# Patient Record
Sex: FEMALE | Race: WHITE | URBAN - METROPOLITAN AREA
[De-identification: names, ages, dates, MRNs, and addresses within clinical notes are randomized per-mention and may not be internally consistent; named-entity substitution may affect disease eponyms.]

---

## 2020-10-22 ENCOUNTER — OFFICE VISIT (OUTPATIENT)
Dept: URBAN - METROPOLITAN AREA CLINIC 41 | Facility: CLINIC | Age: 80
End: 2020-10-22
Payer: MEDICARE

## 2020-10-22 PROCEDURE — 67028 INJECTION EYE DRUG: CPT | Performed by: OPHTHALMOLOGY

## 2020-10-22 PROCEDURE — 92014 COMPRE OPH EXAM EST PT 1/>: CPT | Performed by: OPHTHALMOLOGY

## 2020-10-22 ASSESSMENT — INTRAOCULAR PRESSURE
OD: 13
OS: 16

## 2020-10-22 NOTE — IMPRESSION/PLAN
Impression: Retinal edema, OD. Status: Chronic.
h/o Sterile inflammation OD s/p IVK
s/p IVK x 2 last 03/30/16
s/p PPV/MP/PRP OD 04/04/16 ([facility])
s/p Avastin x 29 last 08/13/2020 (consented 03/05/2020) Plan: Exam and OCT reveal CME OD (351 microns, from 381 microns). The patient prefers not to have FAs. An IVFA from 10/22/2020 demonstrated no telangiectasia. Fundus Photos from 10/22/2020 demonstrated no IRH. The patient notes worsening. Discussed trial of Avastin vs observation. R/B/A discussed with patient. The risks of Avastin were discussed, including off-label use and compounding pharmacy risks, and the patient elects to proceed with Avastin. After 2% Subconjunctival anesthesia & Betadine prep, 1.25mg of Avastin was injected in the eye. Cold compress and Tylenol suggested for pain if needed. Thanks, Luci Colvin Return in 4-6 weeks for follow up, OCT OU (add gel).

## 2020-10-22 NOTE — IMPRESSION/PLAN
Impression: NPDR, OU. Status: Fair Control. Moderate. Plan: DM since 2000. Moderate. No DME. An IVFA from 02/06/2020 demonstrated no NVE OU. Fundus Photos from 02/06/2020 demonstrated IRH. Observe. Blood sugar control.

## 2020-10-22 NOTE — IMPRESSION/PLAN
Impression: h/o ERM, OD. Status: Chronic
s/p PPV/MP/PRP/IVK 04/14/15 (0 [facility]) Plan: Observe.

## 2020-12-03 ENCOUNTER — OFFICE VISIT (OUTPATIENT)
Dept: URBAN - METROPOLITAN AREA CLINIC 41 | Facility: CLINIC | Age: 80
End: 2020-12-03
Payer: MEDICARE

## 2020-12-03 PROCEDURE — 92014 COMPRE OPH EXAM EST PT 1/>: CPT | Performed by: OPHTHALMOLOGY

## 2020-12-03 PROCEDURE — 92134 CPTRZ OPH DX IMG PST SGM RTA: CPT | Performed by: OPHTHALMOLOGY

## 2020-12-03 PROCEDURE — 67028 INJECTION EYE DRUG: CPT | Performed by: OPHTHALMOLOGY

## 2020-12-03 ASSESSMENT — INTRAOCULAR PRESSURE
OD: 17
OS: 16

## 2020-12-03 NOTE — IMPRESSION/PLAN
Impression: h/o ERM, OD. Status: Chronic
s/p PPV/MP/PRP/IVK 04/14/15 (0 [facility]) Plan: Observe.

## 2020-12-03 NOTE — IMPRESSION/PLAN
Impression: Retinal edema, OD. Status: Chronic.
h/o Sterile inflammation OD s/p IVK
s/p IVK x 2 last 03/30/16
s/p PPV/MP/PRP OD 04/04/16 ([facility])
s/p Avastin x 30 last 10/22/2020 (consented 03/05/2020) Plan: Exam and OCT reveal CME OD (353 microns, from 381 microns). The patient prefers not to have FAs. An IVFA from 10/22/2020 demonstrated no telangiectasia. Fundus Photos from 10/22/2020 demonstrated no IRH. The patient notes worsening. Discussed trial of Avastin vs observation. R/B/A discussed with patient. The risks of Avastin were discussed, including off-label use and compounding pharmacy risks, and the patient elects to proceed with Avastin. After 2% Subconjunctival anesthesia & Betadine prep, 1.25mg of Avastin was injected in the eye. Cold compress and Tylenol suggested for pain if needed. Stacey Rizzo Return in 4-6 weeks for follow up, OCT OU (add gel).

## 2021-02-11 ENCOUNTER — OFFICE VISIT (OUTPATIENT)
Dept: URBAN - METROPOLITAN AREA CLINIC 41 | Facility: CLINIC | Age: 81
End: 2021-02-11
Payer: MEDICARE

## 2021-02-11 PROCEDURE — 92014 COMPRE OPH EXAM EST PT 1/>: CPT | Performed by: OPHTHALMOLOGY

## 2021-02-11 PROCEDURE — 67028 INJECTION EYE DRUG: CPT | Performed by: OPHTHALMOLOGY

## 2021-02-11 PROCEDURE — 92134 CPTRZ OPH DX IMG PST SGM RTA: CPT | Performed by: OPHTHALMOLOGY

## 2021-02-11 ASSESSMENT — INTRAOCULAR PRESSURE
OD: 9
OS: 9

## 2021-02-11 NOTE — IMPRESSION/PLAN
Impression: h/o ERM, OD. Status: Chronic
s/p PPV/MP/PRP/IVK 04/14/15 (0 [facility]) Plan: Observe.

## 2021-02-11 NOTE — IMPRESSION/PLAN
Impression: Retinal edema, OD. Status: Chronic.
h/o Sterile inflammation OD s/p IVK
s/p IVK x 2 last 03/30/16
s/p PPV/MP/PRP OD 04/04/16 ([facility])
s/p Avastin x 31 last 12/3/2020 (consented 03/05/2020) Plan: The patient notes blurring. Exam and OCT reveal CME OD (357 microns, from 381 microns). The patient prefers not to have FAs. An IVFA from 10/22/2020 demonstrated no telangiectasia. Fundus Photos from 10/22/2020 demonstrated no IRH. The patient notes worsening. Discussed trial of Avastin vs observation. R/B/A discussed with patient. The risks of Avastin were discussed, including off-label use and compounding pharmacy risks, and the patient elects to proceed with Avastin. After 2% Subconjunctival anesthesia & Betadine prep, 1.25mg of Avastin was injected in the eye. Cold compress and Tylenol suggested for pain if needed. Thanks, Mindy Gordillo Return in 4-6 weeks for follow up, OCT OU (add gel).

## 2021-03-11 ENCOUNTER — OFFICE VISIT (OUTPATIENT)
Dept: URBAN - METROPOLITAN AREA CLINIC 41 | Facility: CLINIC | Age: 81
End: 2021-03-11
Payer: MEDICARE

## 2021-03-11 PROCEDURE — 67028 INJECTION EYE DRUG: CPT | Performed by: OPHTHALMOLOGY

## 2021-03-11 PROCEDURE — 92014 COMPRE OPH EXAM EST PT 1/>: CPT | Performed by: OPHTHALMOLOGY

## 2021-03-11 PROCEDURE — 92134 CPTRZ OPH DX IMG PST SGM RTA: CPT | Performed by: OPHTHALMOLOGY

## 2021-03-11 ASSESSMENT — INTRAOCULAR PRESSURE
OD: 12
OS: 15

## 2021-03-11 NOTE — IMPRESSION/PLAN
Impression: Retinal edema, OD. Status: Chronic.
h/o Sterile inflammation OD s/p IVK
s/p IVK x 2 last 03/30/16
s/p PPV/MP/PRP OD 04/04/16 ([facility])
s/p Avastin x 32 last 02/11/2021 (consented 03/05/2020) Plan: The patient notes continued blurring. Exam and OCT reveal CME OD (357 microns, from 381 microns). The patient prefers not to have FAs. An IVFA from 10/22/2020 demonstrated no telangiectasia. Fundus Photos from 10/22/2020 demonstrated no IRH. The patient notes worsening. Discussed trial of Avastin vs observation. R/B/A discussed with patient. The risks of Avastin were discussed, including off-label use and compounding pharmacy risks, and the patient elects to proceed with Avastin. After 2% Subconjunctival anesthesia & Betadine prep, 1.25mg of Avastin was injected in the eye. Cold compress and Tylenol suggested for pain if needed. Thanks, Arti Del Angel Return in 4-6 weeks for follow up, OCT OU (add gel).

## 2021-03-11 NOTE — IMPRESSION/PLAN
Impression: h/o ERM, OD. Status: Chronic
s/p PPV/MP/PRP/IVK 04/14/15 (0 [facility]) Plan: Observe.

## 2021-04-22 ENCOUNTER — OFFICE VISIT (OUTPATIENT)
Dept: URBAN - METROPOLITAN AREA CLINIC 41 | Facility: CLINIC | Age: 81
End: 2021-04-22
Payer: MEDICARE

## 2021-04-22 DIAGNOSIS — H35.81 RETINAL EDEMA: Primary | ICD-10-CM

## 2021-04-22 DIAGNOSIS — H01.004 UNSPECIFIED BLEPHARITIS LEFT UPPER EYELID: ICD-10-CM

## 2021-04-22 PROCEDURE — 92134 CPTRZ OPH DX IMG PST SGM RTA: CPT | Performed by: OPHTHALMOLOGY

## 2021-04-22 PROCEDURE — 92014 COMPRE OPH EXAM EST PT 1/>: CPT | Performed by: OPHTHALMOLOGY

## 2021-04-22 PROCEDURE — 67028 INJECTION EYE DRUG: CPT | Performed by: OPHTHALMOLOGY

## 2021-04-22 ASSESSMENT — INTRAOCULAR PRESSURE
OD: 15
OS: 16

## 2021-04-22 NOTE — IMPRESSION/PLAN
Impression: h/o ERM, OD. Status: Chronic
s/p PPV/MP/PRP/IVK 04/14/15 (0 [facility]) Plan: Observe.

## 2021-04-22 NOTE — IMPRESSION/PLAN
Impression: Retinal edema, OD. Status: Chronic.
h/o Sterile inflammation OD s/p IVK
s/p IVK x 2 last 03/30/16
s/p PPV/MP/PRP OD 04/04/16 ([facility])
s/p Avastin x 33  last 03/11/2021  Plan: Exam and OCT reveal CME OD (355 microns, from 381 microns). The patient prefers not to have FAs. An IVFA from 10/22/2020 demonstrated no telangiectasia. Fundus Photos from 10/22/2020 demonstrated no IRH. The patient notes worsening. Discussed trial of Avastin vs observation. R/B/A discussed with patient. The risks of Avastin were discussed, including off-label use and compounding pharmacy risks, and the patient elects to proceed with Avastin. After 2% Subconjunctival anesthesia & Betadine prep, 1.25mg of Avastin was injected in the eye. Cold compress and Tylenol suggested for pain if needed. Thanks, Verta Kayser Return in 4-6 weeks for follow up, OCT OU (add gel).

## 2021-05-20 ENCOUNTER — OFFICE VISIT (OUTPATIENT)
Dept: URBAN - METROPOLITAN AREA CLINIC 41 | Facility: CLINIC | Age: 81
End: 2021-05-20
Payer: MEDICARE

## 2021-05-20 DIAGNOSIS — H25.12 AGE-RELATED NUCLEAR CATARACT, LEFT EYE: ICD-10-CM

## 2021-05-20 PROCEDURE — 92014 COMPRE OPH EXAM EST PT 1/>: CPT | Performed by: OPHTHALMOLOGY

## 2021-05-20 PROCEDURE — 67028 INJECTION EYE DRUG: CPT | Performed by: OPHTHALMOLOGY

## 2021-05-20 PROCEDURE — 92134 CPTRZ OPH DX IMG PST SGM RTA: CPT | Performed by: OPHTHALMOLOGY

## 2021-05-20 ASSESSMENT — INTRAOCULAR PRESSURE
OS: 14
OD: 13

## 2021-05-20 NOTE — IMPRESSION/PLAN
Impression: Retinal edema, OD. Status: Chronic.
h/o Sterile inflammation OD s/p IVK
s/p IVK x 2 last 03/30/16
s/p PPV/MP/PRP OD 04/04/16 ([facility])
s/p Avastin x 34  last 04/22/2021 Plan: Exam and OCT reveal CME OD (356 microns, from 381 microns). The patient prefers not to have FAs. An IVFA from 10/22/2020 demonstrated no telangiectasia. Fundus Photos from 10/22/2020 demonstrated no IRH. The patient notes worsening. Discussed trial of Avastin vs observation. R/B/A discussed with patient. The risks of Avastin were discussed, including off-label use and compounding pharmacy risks, and the patient elects to proceed with Avastin. After 2% Subconjunctival anesthesia & Betadine prep, 1.25mg of Avastin was injected in the eye. Cold compress and Tylenol suggested for pain if needed. Thanks, Luci Colvin Return in 4-6 weeks for follow up, OCT OU (add gel).

## 2021-05-20 NOTE — IMPRESSION/PLAN
Impression: h/o ERM, OD. Status: Chronic
s/p PPV/MP/PRP/IVK 04/14/15 (0 [facility]) Plan: Observe.

## 2021-07-01 ENCOUNTER — OFFICE VISIT (OUTPATIENT)
Dept: URBAN - METROPOLITAN AREA CLINIC 41 | Facility: CLINIC | Age: 81
End: 2021-07-01
Payer: MEDICARE

## 2021-07-01 DIAGNOSIS — E11.3393 TYPE 2 DIAB WITH MOD NONP RTNOP WITHOUT MACULAR EDEMA, BI: ICD-10-CM

## 2021-07-01 DIAGNOSIS — H35.371 PUCKERING OF MACULA, RIGHT EYE: ICD-10-CM

## 2021-07-01 PROCEDURE — 92014 COMPRE OPH EXAM EST PT 1/>: CPT | Performed by: OPHTHALMOLOGY

## 2021-07-01 PROCEDURE — 67028 INJECTION EYE DRUG: CPT | Performed by: OPHTHALMOLOGY

## 2021-07-01 PROCEDURE — 92134 CPTRZ OPH DX IMG PST SGM RTA: CPT | Performed by: OPHTHALMOLOGY

## 2021-07-01 ASSESSMENT — INTRAOCULAR PRESSURE
OS: 10
OD: 10

## 2021-07-01 NOTE — IMPRESSION/PLAN
Impression: Retinal edema, OD. Status: Chronic.
h/o Sterile inflammation OD s/p IVK
s/p IVK x 2 last 03/30/16
s/p PPV/MP/PRP OD 04/04/16 ([facility])
s/p Avastin x 35  last 05/20/2021 Plan: Exam and OCT reveal CME OD (358 microns, from 381 microns). The patient prefers not to have FAs. An IVFA from 10/22/2020 demonstrated no telangiectasia. Fundus Photos from 10/22/2020 demonstrated no IRH. The patient notes worsening. Discussed trial of Avastin vs observation. R/B/A discussed with patient. The risks of Avastin were discussed, including off-label use and compounding pharmacy risks, and the patient elects to proceed with Avastin. After 2% Subconjunctival anesthesia & Betadine prep, 1.25mg of Avastin was injected in the eye. Cold compress and Tylenol suggested for pain if needed. Thanks, Yessica Mail Return in 4-6 weeks for follow up, OCT OU (add gel).

## 2021-07-01 NOTE — IMPRESSION/PLAN
Impression: h/o ERM, OD. Status: Chronic
s/p PPV/MP/PRP/IVK 04/14/15 ([facility]) Plan: Observe.

## 2021-07-29 ENCOUNTER — OFFICE VISIT (OUTPATIENT)
Dept: URBAN - METROPOLITAN AREA CLINIC 41 | Facility: CLINIC | Age: 81
End: 2021-07-29
Payer: MEDICARE

## 2021-07-29 DIAGNOSIS — Z96.1 PRESENCE OF INTRAOCULAR LENS: ICD-10-CM

## 2021-07-29 DIAGNOSIS — H53.10 UNSPECIFIED SUBJECTIVE VISUAL DISTURBANCES: ICD-10-CM

## 2021-07-29 PROCEDURE — 92134 CPTRZ OPH DX IMG PST SGM RTA: CPT | Performed by: OPHTHALMOLOGY

## 2021-07-29 PROCEDURE — 92014 COMPRE OPH EXAM EST PT 1/>: CPT | Performed by: OPHTHALMOLOGY

## 2021-07-29 PROCEDURE — 67028 INJECTION EYE DRUG: CPT | Performed by: OPHTHALMOLOGY

## 2021-07-29 ASSESSMENT — INTRAOCULAR PRESSURE
OS: 11
OD: 15

## 2021-07-29 NOTE — IMPRESSION/PLAN
Impression: NPDR, OU. Status: Fair Control. Moderate. Plan: DM since 2000. Moderate. No DME. An IVFA from 10/22/2020 demonstrated no NVE OU. Fundus Photos from 10/22/2020 demonstrated IRH. Observe. Blood sugar control.

## 2021-07-29 NOTE — IMPRESSION/PLAN
Impression: h/o ERM, OD. Status: Chronic
s/p PPV/MP/PRP/IVK 04/14/15 (0 [facility]) Plan: Observe.

## 2021-07-29 NOTE — IMPRESSION/PLAN
Impression: Retinal edema, OD. Status: Chronic.
h/o Sterile inflammation OD s/p IVK
s/p IVK x 2 last 03/30/16
s/p PPV/MP/PRP OD 04/04/16 ([facility])
s/p Avastin x 36  last 07/01/2021  Plan: Exam and OCT reveal CME OD (365 microns, from 381 microns). The patient prefers not to have FAs. An IVFA from 10/22/2020 demonstrated no telangiectasia. Fundus Photos from 10/22/2020 demonstrated no IRH. The patient notes worsening. Discussed trial of Avastin vs observation. R/B/A discussed with patient. The risks of Avastin were discussed, including off-label use and compounding pharmacy risks, and the patient elects to proceed with Avastin. After 2% Subconjunctival anesthesia & Betadine prep, 1.25mg of Avastin was injected in the eye. Cold compress and Tylenol suggested for pain if needed. Thanks, Vishnu Irvin Return in 4-6 weeks for follow up, OCT OU (add gel), IVFA OD 1st (with benadryl)

## 2021-11-18 ENCOUNTER — OFFICE VISIT (OUTPATIENT)
Dept: URBAN - METROPOLITAN AREA CLINIC 41 | Facility: CLINIC | Age: 81
End: 2021-11-18
Payer: MEDICARE

## 2021-11-18 PROCEDURE — 92235 FLUORESCEIN ANGRPH MLTIFRAME: CPT | Performed by: OPHTHALMOLOGY

## 2021-11-18 PROCEDURE — 67028 INJECTION EYE DRUG: CPT | Performed by: OPHTHALMOLOGY

## 2021-11-18 PROCEDURE — 92014 COMPRE OPH EXAM EST PT 1/>: CPT | Performed by: OPHTHALMOLOGY

## 2021-11-18 PROCEDURE — 92134 CPTRZ OPH DX IMG PST SGM RTA: CPT | Performed by: OPHTHALMOLOGY

## 2021-11-18 ASSESSMENT — INTRAOCULAR PRESSURE
OD: 17
OS: 18

## 2021-11-18 NOTE — IMPRESSION/PLAN
Impression: h/o ERM, OD. Status: Chronic
s/p PPV/MP/PRP/IVK 04/14/15 (0 [facility]) Plan: Observe.

## 2021-11-18 NOTE — IMPRESSION/PLAN
Impression: NPDR, OU. Status: Fair Control. Moderate. Plan: DM since 2000. Moderate. No DME. An IVFA from 11/18/2021 demonstrated no NVE OU. Fundus Photos from 11/18/2021 demonstrated IRH. Observe. Blood sugar control.

## 2021-12-16 ENCOUNTER — OFFICE VISIT (OUTPATIENT)
Dept: URBAN - METROPOLITAN AREA CLINIC 41 | Facility: CLINIC | Age: 81
End: 2021-12-16
Payer: MEDICARE

## 2021-12-16 PROCEDURE — 92134 CPTRZ OPH DX IMG PST SGM RTA: CPT | Performed by: OPHTHALMOLOGY

## 2021-12-16 PROCEDURE — 67028 INJECTION EYE DRUG: CPT | Performed by: OPHTHALMOLOGY

## 2021-12-16 PROCEDURE — 92014 COMPRE OPH EXAM EST PT 1/>: CPT | Performed by: OPHTHALMOLOGY

## 2021-12-16 ASSESSMENT — INTRAOCULAR PRESSURE
OD: 12
OS: 14

## 2021-12-16 NOTE — IMPRESSION/PLAN
Impression: Retinal edema, OD. Status: Chronic.
h/o Sterile inflammation OD s/p IVK
s/p IVK x 2 last 03/30/16
s/p PPV/MP/PRP OD 04/04/16 ([facility])
s/p Avastin x 37  last 11/18/2021  Plan: Exam and OCT reveal CME OD (356 microns, from 381 microns). The patient prefers not to have FAs, but if done prefers Benadryl. An IVFA from 11/18/2021 demonstrated no telangiectasia. Fundus Photos from 11/18/2021demonstrated no IRH. The patient notes worsening. Discussed trial of Avastin vs observation. R/B/A discussed with patient. The risks of Avastin were discussed, including off-label use and compounding pharmacy risks, and the patient elects to proceed with Avastin. After 2% Subconjunctival anesthesia & Betadine prep, 1.25mg of Avastin was injected in the eye. Cold compress and Tylenol suggested for pain if needed. Thanks, [employer] Return in 4-6 weeks for follow up, OCT OU (add gel)

## 2022-01-27 ENCOUNTER — OFFICE VISIT (OUTPATIENT)
Dept: URBAN - METROPOLITAN AREA CLINIC 41 | Facility: CLINIC | Age: 82
End: 2022-01-27
Payer: MEDICARE

## 2022-01-27 DIAGNOSIS — H04.123 DRY EYE SYNDROME OF BILATERAL LACRIMAL GLANDS: ICD-10-CM

## 2022-01-27 PROCEDURE — 92014 COMPRE OPH EXAM EST PT 1/>: CPT | Performed by: OPHTHALMOLOGY

## 2022-01-27 PROCEDURE — 67028 INJECTION EYE DRUG: CPT | Performed by: OPHTHALMOLOGY

## 2022-01-27 PROCEDURE — 92134 CPTRZ OPH DX IMG PST SGM RTA: CPT | Performed by: OPHTHALMOLOGY

## 2022-01-27 ASSESSMENT — INTRAOCULAR PRESSURE
OS: 13
OD: 19

## 2022-01-27 NOTE — IMPRESSION/PLAN
Impression: h/o ERM, OD. Status: Chronic
s/p PPV/MP/PRP/IVK 04/14/15 (0 [facility]) Plan: Observe.

## 2022-01-27 NOTE — IMPRESSION/PLAN
Impression: Retinal edema, OD. Status: Chronic.
h/o Sterile inflammation OD s/p IVK
s/p IVK x 2 last 03/30/16
s/p PPV/MP/PRP OD 04/04/16 ([facility])
s/p Avastin x 38  last 12/16/2021  Plan: Exam and OCT reveal CME OD (351 microns, from 381 microns). The patient prefers not to have FAs, but if done prefers Benadryl. An IVFA from 11/18/2021 demonstrated no telangiectasia. Fundus Photos from 11/18/2021demonstrated no IRH. The patient notes worsening. Discussed trial of Avastin vs observation. R/B/A discussed with patient. The risks of Avastin were discussed, including off-label use and compounding pharmacy risks, and the patient elects to proceed with Avastin. After 2% Subconjunctival anesthesia & Betadine prep, 1.25mg of Avastin was injected in the eye. Cold compress and Tylenol suggested for pain if needed. Thanks, Arti Del Angel Return in 4-6 weeks for follow up, OCT OU (add gel)

## 2022-04-07 ENCOUNTER — OFFICE VISIT (OUTPATIENT)
Dept: URBAN - METROPOLITAN AREA CLINIC 41 | Facility: CLINIC | Age: 82
End: 2022-04-07
Payer: MEDICARE

## 2022-04-07 PROCEDURE — 92134 CPTRZ OPH DX IMG PST SGM RTA: CPT | Performed by: OPHTHALMOLOGY

## 2022-04-07 PROCEDURE — 67028 INJECTION EYE DRUG: CPT | Performed by: OPHTHALMOLOGY

## 2022-04-07 PROCEDURE — 92014 COMPRE OPH EXAM EST PT 1/>: CPT | Performed by: OPHTHALMOLOGY

## 2022-04-07 ASSESSMENT — INTRAOCULAR PRESSURE
OS: 13
OD: 15

## 2022-04-07 NOTE — IMPRESSION/PLAN
Impression: Retinal edema, OD. Status: Chronic.
h/o Sterile inflammation OD s/p IVK
s/p IVK x 2 last 03/30/16
s/p PPV/MP/PRP OD 04/04/16 ([facility])
s/p Avastin x 39   last 01/27/2022 Plan: Exam and OCT reveal CME OD (350 microns, from 381 microns). The patient prefers not to have FAs, but if done prefers Benadryl. An IVFA from 11/18/2021 demonstrated no telangiectasia. Fundus Photos from 11/18/2021demonstrated no IRH. The patient notes worsening. Discussed trial of Avastin vs observation. R/B/A discussed with patient. The risks of Avastin were discussed, including off-label use and compounding pharmacy risks, and the patient elects to proceed with Avastin. After 2% Subconjunctival anesthesia & Betadine prep, 1.25mg of Avastin was injected in the eye. Cold compress and Tylenol suggested for pain if needed. Thanks, Vandana Ahmadi Return in 4-6 weeks for follow up, OCT OU (add gel), IVFA OD 1st

## 2022-04-07 NOTE — IMPRESSION/PLAN
Impression: h/o ERM, OD. Status: Chronic
s/p PPV/MP/PRP/IVK 04/14/15 (0 [facility]) Plan: Observe.

## 2022-05-05 ENCOUNTER — OFFICE VISIT (OUTPATIENT)
Dept: URBAN - METROPOLITAN AREA CLINIC 41 | Facility: CLINIC | Age: 82
End: 2022-05-05
Payer: MEDICARE

## 2022-05-05 DIAGNOSIS — Z96.1 PRESENCE OF INTRAOCULAR LENS: ICD-10-CM

## 2022-05-05 DIAGNOSIS — H53.10 UNSPECIFIED SUBJECTIVE VISUAL DISTURBANCES: ICD-10-CM

## 2022-05-05 DIAGNOSIS — H01.004 UNSPECIFIED BLEPHARITIS LEFT UPPER EYELID: ICD-10-CM

## 2022-05-05 DIAGNOSIS — E11.3393 TYPE 2 DIAB WITH MOD NONP RTNOP WITHOUT MACULAR EDEMA, BI: ICD-10-CM

## 2022-05-05 DIAGNOSIS — H35.371 PUCKERING OF MACULA, RIGHT EYE: ICD-10-CM

## 2022-05-05 DIAGNOSIS — H25.12 AGE-RELATED NUCLEAR CATARACT, LEFT EYE: ICD-10-CM

## 2022-05-05 DIAGNOSIS — H35.81 RETINAL EDEMA: Primary | ICD-10-CM

## 2022-05-05 DIAGNOSIS — H04.123 DRY EYE SYNDROME OF BILATERAL LACRIMAL GLANDS: ICD-10-CM

## 2022-05-05 PROCEDURE — 67028 INJECTION EYE DRUG: CPT | Performed by: OPHTHALMOLOGY

## 2022-05-05 PROCEDURE — 92134 CPTRZ OPH DX IMG PST SGM RTA: CPT | Performed by: OPHTHALMOLOGY

## 2022-05-05 PROCEDURE — 92235 FLUORESCEIN ANGRPH MLTIFRAME: CPT | Performed by: OPHTHALMOLOGY

## 2022-05-05 PROCEDURE — 92014 COMPRE OPH EXAM EST PT 1/>: CPT | Performed by: OPHTHALMOLOGY

## 2022-05-05 ASSESSMENT — INTRAOCULAR PRESSURE
OD: 19
OS: 20

## 2022-05-05 NOTE — IMPRESSION/PLAN
Impression: Retinal edema, OD. Status: Chronic.
h/o Sterile inflammation OD s/p IVK
s/p IVK x 2 last 03/30/16
s/p PPV/MP/PRP OD 04/04/16 ([facility])
s/p Avastin x 40  last 04/07/2022  Plan: Exam and OCT reveal CME OD (351 microns, from 381 microns). The patient prefers not to have FAs, but if done prefers Benadryl. An IVFA from 05/05/2022 demonstrated no telangiectasia. Fundus Photos from 05/05/2022 demonstrated no IRH. The patient notes worsening. Discussed trial of Avastin vs observation. R/B/A discussed with patient. The risks of Avastin were discussed, including off-label use and compounding pharmacy risks, and the patient elects to proceed with Avastin. After 2% Subconjunctival anesthesia & Betadine prep, 1.25mg of Avastin was injected in the eye. Cold compress and Tylenol suggested for pain if needed. Matthias, Rohit Castaneda Return in 4-6 weeks for follow up, OCT OU (add gel)

## 2022-05-05 NOTE — IMPRESSION/PLAN
Impression: NPDR, OU. Status: Fair Control. Moderate. Plan: DM since 2000. Moderate. No DME. An IVFA from 05/05/2022 demonstrated no NVE OU. Fundus Photos from 05/05/2022 demonstrated IRH. Observe. Blood sugar control.

## 2022-05-05 NOTE — IMPRESSION/PLAN
Impression: h/o ERM, OD. Status: Chronic
s/p PPV/MP/PRP/IVK 04/14/15 (0 [facility]) Plan: Observe.

## 2022-06-30 ENCOUNTER — OFFICE VISIT (OUTPATIENT)
Dept: URBAN - METROPOLITAN AREA CLINIC 41 | Facility: CLINIC | Age: 82
End: 2022-06-30
Payer: MEDICARE

## 2022-06-30 DIAGNOSIS — E11.3393 TYPE 2 DIAB WITH MOD NONP RTNOP WITHOUT MACULAR EDEMA, BI: ICD-10-CM

## 2022-06-30 DIAGNOSIS — H35.81 RETINAL EDEMA: Primary | ICD-10-CM

## 2022-06-30 DIAGNOSIS — H01.004 UNSPECIFIED BLEPHARITIS LEFT UPPER EYELID: ICD-10-CM

## 2022-06-30 DIAGNOSIS — H04.123 DRY EYE SYNDROME OF BILATERAL LACRIMAL GLANDS: ICD-10-CM

## 2022-06-30 DIAGNOSIS — H35.371 PUCKERING OF MACULA, RIGHT EYE: ICD-10-CM

## 2022-06-30 DIAGNOSIS — H25.12 AGE-RELATED NUCLEAR CATARACT, LEFT EYE: ICD-10-CM

## 2022-06-30 DIAGNOSIS — Z96.1 PRESENCE OF INTRAOCULAR LENS: ICD-10-CM

## 2022-06-30 DIAGNOSIS — H53.10 UNSPECIFIED SUBJECTIVE VISUAL DISTURBANCES: ICD-10-CM

## 2022-06-30 PROCEDURE — 92134 CPTRZ OPH DX IMG PST SGM RTA: CPT | Performed by: OPHTHALMOLOGY

## 2022-06-30 PROCEDURE — 92014 COMPRE OPH EXAM EST PT 1/>: CPT | Performed by: OPHTHALMOLOGY

## 2022-06-30 PROCEDURE — 67028 INJECTION EYE DRUG: CPT | Performed by: OPHTHALMOLOGY

## 2022-06-30 ASSESSMENT — INTRAOCULAR PRESSURE
OS: 13
OD: 12

## 2022-06-30 NOTE — IMPRESSION/PLAN
Impression: Retinal edema, OD. Status: Chronic.
h/o Sterile inflammation OD s/p IVK
s/p IVK x 2 last 03/30/16
s/p PPV/MP/PRP OD 04/04/16 ([facility])
s/p Avastin x 41  last 05/05/2022 Plan: Exam and OCT reveal CME OD (350 microns, from 381 microns). The patient prefers not to have FAs, but if done prefers Benadryl. An IVFA from 05/05/2022 demonstrated no telangiectasia. Fundus Photos from 05/05/2022 demonstrated no IRH. The patient notes worsening. Discussed trial of Avastin vs observation. R/B/A discussed with patient. The risks of Avastin were discussed, including off-label use and compounding pharmacy risks, and the patient elects to proceed with Avastin. After 2% Subconjunctival anesthesia & Betadine prep, 1.25mg of Avastin was injected in the eye. Cold compress and Tylenol suggested for pain if needed. Thanks, Sabrinalazarus Acosta Return in 4-6 weeks for follow up, OCT OU (add gel) 1

## 2022-10-20 ENCOUNTER — OFFICE VISIT (OUTPATIENT)
Dept: URBAN - METROPOLITAN AREA CLINIC 41 | Facility: CLINIC | Age: 82
End: 2022-10-20
Payer: MEDICARE

## 2022-10-20 DIAGNOSIS — Z96.1 PRESENCE OF INTRAOCULAR LENS: ICD-10-CM

## 2022-10-20 DIAGNOSIS — H35.371 PUCKERING OF MACULA, RIGHT EYE: ICD-10-CM

## 2022-10-20 DIAGNOSIS — H25.12 AGE-RELATED NUCLEAR CATARACT, LEFT EYE: ICD-10-CM

## 2022-10-20 DIAGNOSIS — H35.81 RETINAL EDEMA: Primary | ICD-10-CM

## 2022-10-20 DIAGNOSIS — H04.123 DRY EYE SYNDROME OF BILATERAL LACRIMAL GLANDS: ICD-10-CM

## 2022-10-20 DIAGNOSIS — H53.10 UNSPECIFIED SUBJECTIVE VISUAL DISTURBANCES: ICD-10-CM

## 2022-10-20 DIAGNOSIS — E11.3393 TYPE 2 DIAB WITH MOD NONP RTNOP WITHOUT MACULAR EDEMA, BI: ICD-10-CM

## 2022-10-20 DIAGNOSIS — H01.004 UNSPECIFIED BLEPHARITIS LEFT UPPER EYELID: ICD-10-CM

## 2022-10-20 PROCEDURE — 67028 INJECTION EYE DRUG: CPT | Performed by: OPHTHALMOLOGY

## 2022-10-20 PROCEDURE — 92014 COMPRE OPH EXAM EST PT 1/>: CPT | Performed by: OPHTHALMOLOGY

## 2022-10-20 ASSESSMENT — INTRAOCULAR PRESSURE
OD: 14
OS: 15

## 2022-10-20 NOTE — IMPRESSION/PLAN
Impression: h/o ERM, OD. Status: Chronic
s/p PPV/MP/PRP/IVK 04/14/15 (0 [facility]) Plan: Observe.

## 2022-10-20 NOTE — IMPRESSION/PLAN
Impression: Retinal edema, OD. Status: Chronic.
h/o Sterile inflammation OD s/p IVK
s/p IVK x 2 last 03/30/16
s/p PPV/MP/PRP OD 04/04/16 ([facility])
s/p Avastin x 42   last 06/30/2022  Plan: Exam and OCT reveal CME OD (350 microns, from 381 microns). The patient prefers not to have FAs, but if done prefers Benadryl. An IVFA from 05/05/2022 demonstrated no telangiectasia. Fundus Photos from 05/05/2022 demonstrated no IRH. The patient notes worsening. Discussed trial of Avastin vs observation. R/B/A discussed with patient. The risks of Avastin were discussed, including off-label use and compounding pharmacy risks, and the patient elects to proceed with Avastin. After 2% Subconjunctival anesthesia & Betadine prep, 1.25mg of Avastin was injected in the eye. Cold compress and Tylenol suggested for pain if needed. Matthias, Azalea Fatima Return in 4-6 weeks for follow up, OCT OU (add gel)

## 2022-10-20 NOTE — IMPRESSION/PLAN
Impression: MINDY ALBERTO. Status: Symptomatic. Plan: AFT's. Skin Substitute Text: The defect edges were debeveled with a #15 scalpel blade.  Given the location of the defect, shape of the defect and the proximity to free margins a skin substitute graft was deemed most appropriate.  The graft material was trimmed to fit the size of the defect. The graft was then placed in the primary defect and oriented appropriately.

## 2022-12-01 ENCOUNTER — OFFICE VISIT (OUTPATIENT)
Dept: URBAN - METROPOLITAN AREA CLINIC 41 | Facility: CLINIC | Age: 82
End: 2022-12-01
Payer: MEDICARE

## 2022-12-01 DIAGNOSIS — H35.371 PUCKERING OF MACULA, RIGHT EYE: ICD-10-CM

## 2022-12-01 DIAGNOSIS — H01.004 UNSPECIFIED BLEPHARITIS LEFT UPPER EYELID: ICD-10-CM

## 2022-12-01 DIAGNOSIS — Z96.1 PRESENCE OF INTRAOCULAR LENS: ICD-10-CM

## 2022-12-01 DIAGNOSIS — H35.81 RETINAL EDEMA: Primary | ICD-10-CM

## 2022-12-01 DIAGNOSIS — H04.123 DRY EYE SYNDROME OF BILATERAL LACRIMAL GLANDS: ICD-10-CM

## 2022-12-01 DIAGNOSIS — H25.12 AGE-RELATED NUCLEAR CATARACT, LEFT EYE: ICD-10-CM

## 2022-12-01 DIAGNOSIS — H53.10 UNSPECIFIED SUBJECTIVE VISUAL DISTURBANCES: ICD-10-CM

## 2022-12-01 DIAGNOSIS — E11.3393 TYPE 2 DIAB WITH MOD NONP RTNOP WITHOUT MACULAR EDEMA, BI: ICD-10-CM

## 2022-12-01 PROCEDURE — 92014 COMPRE OPH EXAM EST PT 1/>: CPT | Performed by: OPHTHALMOLOGY

## 2022-12-01 PROCEDURE — 92134 CPTRZ OPH DX IMG PST SGM RTA: CPT | Performed by: OPHTHALMOLOGY

## 2022-12-01 PROCEDURE — 67028 INJECTION EYE DRUG: CPT | Performed by: OPHTHALMOLOGY

## 2022-12-01 ASSESSMENT — INTRAOCULAR PRESSURE
OD: 20
OS: 25

## 2022-12-01 NOTE — IMPRESSION/PLAN
Impression: h/o ERM, OD. Status: Chronic
s/p PPV/MP/PRP/IVK 04/14/15 (0 [facility]) Plan: Observe.

## 2022-12-01 NOTE — IMPRESSION/PLAN
Impression: Retinal edema, OD. Status: Chronic.
h/o Sterile inflammation OD s/p IVK
s/p IVK x 2 last 03/30/16
s/p PPV/MP/PRP OD 04/04/16 ([facility])
s/p Avastin x 43  last 10/20/2022  Plan: Exam and OCT reveal CME OD (354 microns, from 381 microns). The patient prefers not to have FAs, but if done prefers Benadryl. An IVFA from 05/05/2022 demonstrated no telangiectasia. Fundus Photos from 05/05/2022 demonstrated no IRH. The patient notes worsening. Discussed trial of Avastin vs observation. R/B/A discussed with patient. The risks of Avastin were discussed, including off-label use and compounding pharmacy risks, and the patient elects to proceed with Avastin. After 2% Subconjunctival anesthesia & Betadine prep, 1.25mg of Avastin was injected in the eye. Cold compress and Tylenol suggested for pain if needed. Thanks, Nata Senior Return in 4-6 weeks for follow up, OCT OU (add gel), IVFA OD 1st

## 2023-01-26 ENCOUNTER — OFFICE VISIT (OUTPATIENT)
Dept: URBAN - METROPOLITAN AREA CLINIC 41 | Facility: CLINIC | Age: 83
End: 2023-01-26
Payer: MEDICARE

## 2023-01-26 DIAGNOSIS — E11.3393 TYPE 2 DIAB WITH MOD NONP RTNOP WITHOUT MACULAR EDEMA, BI: ICD-10-CM

## 2023-01-26 DIAGNOSIS — H04.123 DRY EYE SYNDROME OF BILATERAL LACRIMAL GLANDS: ICD-10-CM

## 2023-01-26 DIAGNOSIS — H35.371 PUCKERING OF MACULA, RIGHT EYE: ICD-10-CM

## 2023-01-26 DIAGNOSIS — H35.81 RETINAL EDEMA: Primary | ICD-10-CM

## 2023-01-26 DIAGNOSIS — Z96.1 PRESENCE OF INTRAOCULAR LENS: ICD-10-CM

## 2023-01-26 DIAGNOSIS — H25.12 AGE-RELATED NUCLEAR CATARACT, LEFT EYE: ICD-10-CM

## 2023-01-26 DIAGNOSIS — H53.10 UNSPECIFIED SUBJECTIVE VISUAL DISTURBANCES: ICD-10-CM

## 2023-01-26 DIAGNOSIS — H01.004 UNSPECIFIED BLEPHARITIS LEFT UPPER EYELID: ICD-10-CM

## 2023-01-26 PROCEDURE — 92134 CPTRZ OPH DX IMG PST SGM RTA: CPT | Performed by: OPHTHALMOLOGY

## 2023-01-26 PROCEDURE — 92235 FLUORESCEIN ANGRPH MLTIFRAME: CPT | Performed by: OPHTHALMOLOGY

## 2023-01-26 PROCEDURE — 92014 COMPRE OPH EXAM EST PT 1/>: CPT | Performed by: OPHTHALMOLOGY

## 2023-01-26 PROCEDURE — 67028 INJECTION EYE DRUG: CPT | Performed by: OPHTHALMOLOGY

## 2023-01-26 ASSESSMENT — INTRAOCULAR PRESSURE
OS: 12
OD: 14

## 2023-01-26 NOTE — IMPRESSION/PLAN
Impression: h/o ERM, OD. Status: Chronic
s/p PPV/MP/PRP/IVK 04/14/15 (0 [facility]) Plan: Observe.

## 2023-01-26 NOTE — IMPRESSION/PLAN
Impression: NPDR, OU. Status: Fair Control. Moderate. Plan: DM since 2000. Moderate. No DME. An IVFA from 01/26/2023 demonstrated no NVE OU. Fundus Photos from 01/26/2023 demonstrated IRH. Observe. Blood sugar control.

## 2023-01-26 NOTE — IMPRESSION/PLAN
Impression: Retinal edema, OD. Status: Chronic.
h/o Sterile inflammation OD s/p IVK
s/p IVK x 2 last 03/30/16
s/p PPV/MP/PRP OD 04/04/16 ([facility])
s/p Avastin x 43  last 12/01/2022 Plan: Exam and OCT reveal CME OD (291 microns, from 381 microns). The patient prefers not to have FAs, but if done prefers Benadryl. An IVFA from 01/26/2023 demonstrated no telangiectasia. Fundus Photos from 01/26/2023 demonstrated no IRH. The patient notes worsening. Discussed trial of Avastin vs observation. R/B/A discussed with patient. The risks of Avastin were discussed, including off-label use and compounding pharmacy risks, and the patient elects to proceed with Avastin. After 2% Subconjunctival anesthesia & Betadine prep, 1.25mg of Avastin was injected in the eye. Cold compress and Tylenol suggested for pain if needed. Thanks, Yulia Lower Return in 4-6 weeks for follow up, OCT OU (add gel)

## 2023-02-23 ENCOUNTER — OFFICE VISIT (OUTPATIENT)
Dept: URBAN - METROPOLITAN AREA CLINIC 41 | Facility: CLINIC | Age: 83
End: 2023-02-23
Payer: MEDICARE

## 2023-02-23 DIAGNOSIS — H53.10 UNSPECIFIED SUBJECTIVE VISUAL DISTURBANCES: ICD-10-CM

## 2023-02-23 DIAGNOSIS — H35.371 PUCKERING OF MACULA, RIGHT EYE: ICD-10-CM

## 2023-02-23 DIAGNOSIS — Z96.1 PRESENCE OF INTRAOCULAR LENS: ICD-10-CM

## 2023-02-23 DIAGNOSIS — H25.12 AGE-RELATED NUCLEAR CATARACT, LEFT EYE: ICD-10-CM

## 2023-02-23 DIAGNOSIS — H35.81 RETINAL EDEMA: Primary | ICD-10-CM

## 2023-02-23 DIAGNOSIS — E11.3393 TYPE 2 DIAB WITH MOD NONP RTNOP WITHOUT MACULAR EDEMA, BI: ICD-10-CM

## 2023-02-23 DIAGNOSIS — H04.123 DRY EYE SYNDROME OF BILATERAL LACRIMAL GLANDS: ICD-10-CM

## 2023-02-23 DIAGNOSIS — H01.004 UNSPECIFIED BLEPHARITIS LEFT UPPER EYELID: ICD-10-CM

## 2023-02-23 PROCEDURE — 92134 CPTRZ OPH DX IMG PST SGM RTA: CPT | Performed by: OPHTHALMOLOGY

## 2023-02-23 PROCEDURE — 67028 INJECTION EYE DRUG: CPT | Performed by: OPHTHALMOLOGY

## 2023-02-23 PROCEDURE — 92014 COMPRE OPH EXAM EST PT 1/>: CPT | Performed by: OPHTHALMOLOGY

## 2023-02-23 ASSESSMENT — INTRAOCULAR PRESSURE
OD: 20
OS: 15

## 2023-02-23 NOTE — IMPRESSION/PLAN
Impression: h/o ERM, OD. Status: Chronic
s/p PPV/MP/PRP/IVK 04/14/15 (0 [facility]) Plan: Observe.

## 2023-02-23 NOTE — IMPRESSION/PLAN
Impression: Retinal edema, OD. Status: Chronic.
h/o Sterile inflammation OD s/p IVK
s/p IVK x 2 last 03/30/16
s/p PPV/MP/PRP OD 04/04/16 ([facility])
s/p Avastin x 44  last 01/26/2023 Plan: Exam and OCT reveal CME OD (354 microns, from 381 microns). The patient prefers not to have FAs, but if done prefers Benadryl. An IVFA from 01/26/2023 demonstrated no telangiectasia. Fundus Photos from 01/26/2023 demonstrated no IRH. The patient notes worsening. Discussed trial of Avastin vs observation. R/B/A discussed with patient. The risks of Avastin were discussed, including off-label use and compounding pharmacy risks, and the patient elects to proceed with Avastin. After 2% Subconjunctival anesthesia & Betadine prep, 1.25mg of Avastin was injected in the eye. Cold compress and Tylenol suggested for pain if needed. Thanks, Jessika Glez Return in 6-8 weeks for follow up, OCT OU (add gel)

## 2023-03-01 ENCOUNTER — OFFICE VISIT (OUTPATIENT)
Dept: URBAN - METROPOLITAN AREA CLINIC 21 | Facility: CLINIC | Age: 83
End: 2023-03-01
Payer: MEDICARE

## 2023-03-01 DIAGNOSIS — H04.123 DRY EYE SYNDROME OF BILATERAL LACRIMAL GLANDS: ICD-10-CM

## 2023-03-01 DIAGNOSIS — H35.81 RETINAL EDEMA: Primary | ICD-10-CM

## 2023-03-01 DIAGNOSIS — H25.12 AGE-RELATED NUCLEAR CATARACT, LEFT EYE: ICD-10-CM

## 2023-03-01 DIAGNOSIS — E11.9 DIABETES MELLITUS TYPE 2 WITHOUT MENTION OF COMPLICATION: ICD-10-CM

## 2023-03-01 PROCEDURE — 99203 OFFICE O/P NEW LOW 30 MIN: CPT

## 2023-03-01 PROCEDURE — 92134 CPTRZ OPH DX IMG PST SGM RTA: CPT

## 2023-03-01 ASSESSMENT — INTRAOCULAR PRESSURE
OS: 18
OD: 18

## 2023-03-01 NOTE — IMPRESSION/PLAN
Impression: Age-related nuclear cataract, left eye: H25.12. Plan: Pt educated on condition. Pt is asymptomatic therefore no treatment recommended at this time. Monitor. Pt advised to RTC with any changes in vision.

## 2023-03-01 NOTE — IMPRESSION/PLAN
Impression: Retinal edema: H35.81.
--h/x of chronic edema
--Avastin injections, last 2/23/2023 Plan: MOCT shows flat retina, s/p vitrectomy OD, WNL OS. Continue management with Avastin injections with Dr. Rudi Mcdowell.

## 2023-03-01 NOTE — IMPRESSION/PLAN
Impression: Diabetes mellitus Type 2 without mention of complication: O25.3. Plan: Pt educated on condition. No retinopathy found on DFE today. Discussed importance of controlling blood sugar. Letter sent to PCP. Monitor yearly.

## 2023-06-20 ENCOUNTER — OFFICE VISIT (OUTPATIENT)
Dept: URBAN - METROPOLITAN AREA CLINIC 41 | Facility: CLINIC | Age: 83
End: 2023-06-20
Payer: MEDICARE

## 2023-06-20 DIAGNOSIS — H25.12 AGE-RELATED NUCLEAR CATARACT, LEFT EYE: ICD-10-CM

## 2023-06-20 DIAGNOSIS — H35.371 PUCKERING OF MACULA, RIGHT EYE: ICD-10-CM

## 2023-06-20 DIAGNOSIS — H01.004 UNSPECIFIED BLEPHARITIS LEFT UPPER EYELID: ICD-10-CM

## 2023-06-20 DIAGNOSIS — H35.81 RETINAL EDEMA: Primary | ICD-10-CM

## 2023-06-20 DIAGNOSIS — Z96.1 PRESENCE OF INTRAOCULAR LENS: ICD-10-CM

## 2023-06-20 DIAGNOSIS — H04.123 DRY EYE SYNDROME OF BILATERAL LACRIMAL GLANDS: ICD-10-CM

## 2023-06-20 DIAGNOSIS — E11.3393 TYPE 2 DIAB WITH MOD NONP RTNOP WITHOUT MACULAR EDEMA, BI: ICD-10-CM

## 2023-06-20 PROCEDURE — 92134 CPTRZ OPH DX IMG PST SGM RTA: CPT | Performed by: OPHTHALMOLOGY

## 2023-06-20 PROCEDURE — 92014 COMPRE OPH EXAM EST PT 1/>: CPT | Performed by: OPHTHALMOLOGY

## 2023-06-20 ASSESSMENT — INTRAOCULAR PRESSURE
OD: 15
OS: 15

## 2023-06-20 NOTE — IMPRESSION/PLAN
Impression: Retinal edema, OD. Status: Chronic.
h/o Sterile inflammation OD s/p IVK
s/p IVK x 2 last 03/30/16
s/p PPV/MP/PRP OD 04/04/16 ([facility])
s/p Avastin x 45  last 02/23/2023 ([address] [place] [address]) Plan: Exam and OCT reveal resolved CME and mild ERM OD (344 microns, from 354 microns, from 381 microns) s/p mult Avastin, last 4 months ago. The patient prefers not to have FAs, but if done prefers Benadryl. An IVFA from 01/26/2023 demonstrated no telangiectasia. Fundus Photos from 01/26/2023 demonstrated no IRH. Recommend observation. Pt will call with s/s dec [facility]/floaters. 

Return in 6 months for DFE/OCT/Optos FA OU (transit OD0, pre-medicate with Benadryl

## 2023-06-20 NOTE — IMPRESSION/PLAN
Impression: h/o ERM, OD. Status: Chronic
s/p PPV/MP/PRP/IVK 04/14/15 (0 [facility]) Plan: Observe.

## 2024-05-21 ENCOUNTER — OFFICE VISIT (OUTPATIENT)
Dept: URBAN - METROPOLITAN AREA CLINIC 41 | Facility: CLINIC | Age: 84
End: 2024-05-21
Payer: MEDICARE

## 2024-05-21 DIAGNOSIS — H35.371 PUCKERING OF MACULA, RIGHT EYE: ICD-10-CM

## 2024-05-21 DIAGNOSIS — H01.004 UNSPECIFIED BLEPHARITIS LEFT UPPER EYELID: ICD-10-CM

## 2024-05-21 DIAGNOSIS — H35.81 RETINAL EDEMA: Primary | ICD-10-CM

## 2024-05-21 DIAGNOSIS — Z96.1 PRESENCE OF INTRAOCULAR LENS: ICD-10-CM

## 2024-05-21 DIAGNOSIS — E11.3393 TYPE 2 DIABETES MELLITUS WITH MODERATE NONPROLIFERATIVE DIABETIC RETINOPATHY WITHOUT MACULAR EDEMA, BILATERAL: ICD-10-CM

## 2024-05-21 DIAGNOSIS — H25.12 AGE-RELATED NUCLEAR CATARACT, LEFT EYE: ICD-10-CM

## 2024-05-21 PROCEDURE — 92014 COMPRE OPH EXAM EST PT 1/>: CPT | Performed by: OPHTHALMOLOGY

## 2024-05-21 PROCEDURE — 92235 FLUORESCEIN ANGRPH MLTIFRAME: CPT | Performed by: OPHTHALMOLOGY

## 2024-05-21 PROCEDURE — 92250 FUNDUS PHOTOGRAPHY W/I&R: CPT | Performed by: OPHTHALMOLOGY

## 2024-05-21 PROCEDURE — 92134 CPTRZ OPH DX IMG PST SGM RTA: CPT | Performed by: OPHTHALMOLOGY

## 2024-05-21 ASSESSMENT — INTRAOCULAR PRESSURE
OD: 15
OS: 15